# Patient Record
Sex: MALE | Race: WHITE | NOT HISPANIC OR LATINO | URBAN - METROPOLITAN AREA
[De-identification: names, ages, dates, MRNs, and addresses within clinical notes are randomized per-mention and may not be internally consistent; named-entity substitution may affect disease eponyms.]

---

## 2024-05-25 ENCOUNTER — EMERGENCY (EMERGENCY)
Facility: HOSPITAL | Age: 49
LOS: 1 days | Discharge: HOME | End: 2024-05-25
Attending: EMERGENCY MEDICINE | Admitting: EMERGENCY MEDICINE
Payer: COMMERCIAL

## 2024-05-25 VITALS
TEMPERATURE: 98 F | HEART RATE: 82 BPM | RESPIRATION RATE: 16 BRPM | OXYGEN SATURATION: 96 % | SYSTOLIC BLOOD PRESSURE: 147 MMHG | HEIGHT: 72 IN | DIASTOLIC BLOOD PRESSURE: 78 MMHG | WEIGHT: 175.05 LBS

## 2024-05-25 DIAGNOSIS — R09.A2 FOREIGN BODY SENSATION, THROAT: ICD-10-CM

## 2024-05-25 DIAGNOSIS — R07.0 PAIN IN THROAT: ICD-10-CM

## 2024-05-25 PROCEDURE — 31575 DIAGNOSTIC LARYNGOSCOPY: CPT

## 2024-05-25 PROCEDURE — 70490 CT SOFT TISSUE NECK W/O DYE: CPT | Mod: 26,MC

## 2024-05-25 PROCEDURE — 99284 EMERGENCY DEPT VISIT MOD MDM: CPT

## 2024-05-25 PROCEDURE — 70490 CT SOFT TISSUE NECK W/O DYE: CPT | Mod: MC

## 2024-05-25 PROCEDURE — 99285 EMERGENCY DEPT VISIT HI MDM: CPT | Mod: 25

## 2024-05-25 NOTE — ED PROVIDER NOTE - OBJECTIVE STATEMENT
47 yo m presents c/o fb sensation to throat after eating fish last night.  Pt reports no difficulty breathing/swallowing.

## 2024-05-25 NOTE — CONSULT NOTE ADULT - ASSESSMENT
ASSESSMENT/PLAN:    IMPRESSION: MARILEE RAMIREZ  is a 48y Male with no significant PMhx presenting with globus sensation with concern for fish bone. Flexible laryngoscopy within normal limits with no evidence of foreign body or mucosal trauma. CT neck performed to rule out submucous foreign body, none visualized. Patient most likely has a micro-scratch from fish bone with residual globus    RECOMMENDATIONS:  - Pepcid 20 mg daily for 1 week  - Strict return precautions discussed: fever, pain with neck ROM, inability to tolerate PO, shortness of breath  - Please page ENT with any additional questions or concerns

## 2024-05-25 NOTE — ED PROVIDER NOTE - CLINICAL SUMMARY MEDICAL DECISION MAKING FREE TEXT BOX
47 y/o m presents c/o throat pain, fb sensation to throat after eating fish last night, concerned for bony fb.  No airway compromise in ED, pt comfortable, tolerating his own secretions.  ENT consulted and no fb seen on direct laryngoscopy, recommend CT neck which was done and neg, pt feeling better, d/c

## 2024-05-25 NOTE — ED PROVIDER NOTE - ATTENDING APP SHARED VISIT CONTRIBUTION OF CARE
49 yo M c/o fish bone stuck in throat since eating dinner last pm w pain on R side.  Pt able to swallow w discomfort, no difficulty breathing, fever.  Well appearing, nad, nc/at, op benign w/o fb visualized, neck nontender w/o crepitus, no drool, strido, + tolerating own secretions, no swelling, ext no gross deformity, no gross neuro deficits  Pt c/o fish bone fb sensation.  Plan ENT consult for scope.  ? fb vs irritation s/p recent fb.  No aw compromise.

## 2024-05-25 NOTE — CONSULT NOTE ADULT - SUBJECTIVE AND OBJECTIVE BOX
OTOLARYNGOLOGY (ENT) CONSULTATION NOTE    PATIENT: MARILEE RAMIREZ     MRN: [id number]       : 73  DATE OF ADMISSION:24  DATE OF SERVICE:  24 @ 13:00    CHIEF COMPLAINT:  globus after eating fish bone     HISTORY OF PRESENT ILLNESS:  MARILEE RAMIREZ  is a 48y Male with no significant PMHx presenting to [facility] due to concern for a fish bone stuck in his throat, ENT consulted to evaluate. Patient reports that last night he ate branzino and immediately felt as though there was a potential bone stuck in the right side of his throat. Patient attempted to drink water, eat bread, and make himself vomit with slight improvement in the globus sensation he was experiencing. Throughout this time, he denies any pain with swallowing, dysphagia, dysphonia, dyspnea, fever, or pain with neck range of motion. He reported feeling better this morning but still having a residual scratchy sensation in his throat. No other complaints at this time.     PAST MEDICAL HISTORY:  No pertinent past medical history        CURRENT MEDICATIONS       HOME MEDICATIONS:      ALLERGIES:  No Known Allergies    SOCIAL HISTORY: Pertinent included in HPI   FAMILY HISTORY      SURGICAL HISTORY:      REVIEW OF SYSTEMS: The patient was asked and responded to a review of systems regarding the following symptoms: constitutional, eye, ears, nose, mouth, throat, cardiovascular, respiratory. Pertinent factors have been included in the HPI.     PHYSICAL EXAMINATION:  General: NAD, A+Ox3  Respiratory: No respiratory distress, stridor, or stertor  Voice quality: normal  Face:  Symmetric without masses or lesions  OU: EOMI  Right: Pinna wnl, EAC clear, TM intact, no effusion  Left: Pinna wnl, EAC clear, TM intact, no effusion  Nose: nasal cavity clear bilaterally, inferior turbinates normal, mucosa normal without crusting or bleeding  OC/OP: tongue normal, floor of mouth WNL, no masses or lesions, OP clear  Neck: soft/flat, no LAD  Neuro: CNII-XII intact      Findings:  LARYNGOSCOPY EXAM:     Verbal consent was obtained from patient prior to procedure.    Indication: rule out fish bone         Flexible laryngoscopy was performed and revealed the following:    Nasopharynx had no mass or exudate.    Base of tongue was symmetric and not enlarged.    Vallecula was clear    Epiglottis, both aryepiglottic folds and both false vocal folds were normal    Arytenoids both without edema and erythema     True vocal folds were fully mobile and without lesions.     Post cricoid area was clear    Interarytenoid edema was absent  No foreign body visualized or evidence of mucosal trauma     The patient tolerated the procedure well.           Vital Signs:  T(C): 36.7 (05-25-24 @ 10:48), Max: 36.7 (05-25-24 @ 10:48)  HR: 82 (05-25-24 @ 10:48) (82 - 82)  BP: 147/78 (05-25-24 @ 10:48) (147/78 - 147/78)  RR: 16 (05-25-24 @ 10:48) (16 - 16)  SpO2: 96% (05-25-24 @ 10:48) (96% - 96%)        [id number]    MICROBIOLOGY:      PATHOLOGY:

## 2024-05-25 NOTE — ED ADULT NURSE NOTE - OBJECTIVE STATEMENT
Patient came to ER stating "I ate a fish bone and it feels like there may still be something in my throat". Patient is able to speak in complete sentences, no drooling, no difficulty breathing.

## 2024-05-25 NOTE — ED PROVIDER NOTE - PATIENT PORTAL LINK FT
You can access the FollowMyHealth Patient Portal offered by [facility] by registering at the following website: [email or web address]. By joining [facility]’s FollowMyHealth portal, you will also be able to view your health information using other applications (apps) compatible with our system.

## 2024-05-25 NOTE — ED ADULT TRIAGE NOTE - CHIEF COMPLAINT QUOTE
Spoke to Livia May    CONFIRMED procedure arrival time of2 pm on 10/19/2020  Reiterated instructions including:  -Directions to check in desk  -NPO after midnight night prior to procedure  -Pre-procedure LABS being drawn on 10/17/2020  -COVID test being done 10/17/2020  -Confirmed no fever, cough, or shortness of breath in the past 30 days  -Confirmed no redness, rash, irritation, or yeast infection to groin area.     -Bathe night prior and morning prior to procedure with Hibiclens solution or an antibacterial soap  -Reviewed current visitor policy    Pt verbalizes understanding of above and appreciates call.  
Fish bone stuck in throat since last night. Pt maintaining secretions. Denies PMH.

## 2024-05-25 NOTE — ED PROVIDER NOTE - CPE EDP RESP NORM
Per verbal order of dr. Vamshi Bettencourt pt can start an antibiotic for 10 days.  Pt informed via voicemail     Date of last visit:  12/28/2021  Date of next visit:  Visit date not found    Requested Prescriptions     Signed Prescriptions Disp Refills    cephALEXin (KEFLEX) 500 MG capsule 30 capsule 0     Sig: Take 1 capsule by mouth 3 times daily for 10 days     Authorizing Provider: Consuelo Jj     Ordering User: Gerry Carmona
How Severe Is Your Skin Lesion?: mild
Have Your Skin Lesions Been Treated?: not been treated
Is This A New Presentation, Or A Follow-Up?: Skin Lesion
normal...

## 2025-03-07 ENCOUNTER — APPOINTMENT (OUTPATIENT)
Dept: URBAN - METROPOLITAN AREA CLINIC 152 | Facility: CLINIC | Age: 50
Setting detail: DERMATOLOGY
End: 2025-03-07

## 2025-03-07 DIAGNOSIS — L21.8 OTHER SEBORRHEIC DERMATITIS: ICD-10-CM | Status: WELL CONTROLLED

## 2025-03-07 PROCEDURE — ? PRESCRIPTION

## 2025-03-07 PROCEDURE — ? DIAGNOSIS COMMENT

## 2025-03-07 PROCEDURE — ? PRESCRIPTION MEDICATION MANAGEMENT

## 2025-03-07 PROCEDURE — ? COUNSELING

## 2025-03-07 PROCEDURE — 99204 OFFICE O/P NEW MOD 45 MIN: CPT

## 2025-03-07 RX ORDER — FLUOCINONIDE 0.5 MG/ML
SOLUTION TOPICAL
Qty: 60 | Refills: 3 | Status: ERX | COMMUNITY
Start: 2025-03-07

## 2025-03-07 RX ORDER — KETOCONAZOLE 20 MG/ML
SHAMPOO, SUSPENSION TOPICAL
Qty: 120 | Refills: 4 | Status: ERX | COMMUNITY
Start: 2025-03-07

## 2025-03-07 RX ADMIN — KETOCONAZOLE: 20 SHAMPOO, SUSPENSION TOPICAL at 00:00

## 2025-03-07 RX ADMIN — FLUOCINONIDE: 0.5 SOLUTION TOPICAL at 00:00

## 2025-03-07 NOTE — HPI: RASH (SEBORRHEIC DERMATITIS)
How Severe Is It?: mild
Is This A New Presentation, Or A Follow-Up?: Follow Up Seborrheic Dermatitis
Additional History: Pt is here for dandruff, has been using Fluocinolone Solution, pt states

## 2025-03-07 NOTE — PROCEDURE: DIAGNOSIS COMMENT
Comment: mild today on exam \\nDiscussed nature and etiology. \\npreviously was using fluocinonide and ketoconazole - admits kept seb derm controlled - needs refills today \\nalso alternating between anti-dandruff OTC shampoos- feel like its helping - admits he rarely applied steroid - once every few months if that \\nRecommended pt to continue fluocinonide 0.05 % topical solution, Apply to the affected areas on scalp MWF for 3 weeks repeat during flares\\nRecommended pt to continue ketoconazole 2 % shampoo, Lather into scalp 2-3x/week & allow to sit for 5 minutes then rinse out.\\nRV 6 months or sooner if needed
Render Risk Assessment In Note?: no
Detail Level: Simple

## 2025-03-07 NOTE — PROCEDURE: PRESCRIPTION MEDICATION MANAGEMENT
Continue Regimen: fluocinonide 0.05 % topical solution \\nketoconazole 2 % shampoo
Detail Level: Zone
Render In Strict Bullet Format?: No

## 2025-07-16 ENCOUNTER — APPOINTMENT (OUTPATIENT)
Dept: URBAN - METROPOLITAN AREA CLINIC 151 | Facility: CLINIC | Age: 50
Setting detail: DERMATOLOGY
End: 2025-07-16

## 2025-07-16 DIAGNOSIS — B02.9 ZOSTER WITHOUT COMPLICATIONS: ICD-10-CM | Status: RESOLVING

## 2025-07-16 PROCEDURE — ? PRESCRIPTION

## 2025-07-16 PROCEDURE — ? DIAGNOSIS COMMENT

## 2025-07-16 PROCEDURE — ? COUNSELING

## 2025-07-16 RX ORDER — TRIAMCINOLONE ACETONIDE 1 MG/G
CREAM TOPICAL
Qty: 80 | Refills: 0 | Status: ERX | COMMUNITY
Start: 2025-07-16

## 2025-07-16 RX ADMIN — TRIAMCINOLONE ACETONIDE: 1 CREAM TOPICAL at 00:00

## 2025-07-16 ASSESSMENT — LOCATION DETAILED DESCRIPTION DERM
LOCATION DETAILED: T2 RIGHT ANTERIOR DERMATOME
LOCATION DETAILED: T1 RIGHT ANTERIOR DERMATOME
LOCATION DETAILED: T1 RIGHT POSTERIOR DERMATOME

## 2025-07-16 ASSESSMENT — LOCATION ZONE DERM: LOCATION ZONE: TRUNK

## 2025-07-16 ASSESSMENT — LOCATION SIMPLE DESCRIPTION DERM
LOCATION SIMPLE: T1 RIGHT POSTERIOR DERMATOME
LOCATION SIMPLE: T1 RIGHT ANTERIOR DERMATOME
LOCATION SIMPLE: T2 RIGHT ANTERIOR DERMATOME

## 2025-07-16 NOTE — HPI: OTHER
Condition:: Shingles lesions
Please Describe Your Condition:: Healing from shingles started antiviral (avalocyline) last Tuesday. Has gotten better. No fever, nerve sensitivity sharp pains from shingles medication, has psoriatic arthritis (since 30s) not on pain medication. No PCP.

## 2025-07-16 NOTE — PROCEDURE: DIAGNOSIS COMMENT
Detail Level: Simple
Comment: disc etiology and nature in great detail with pt- he was started on antiviral medications (acyclovir vs valacyclovir ) and was rx 10 days - shingles already crusted over and improving - pt c/o of shooting pain - disc with pt shingles affects sensory nerves and their dermatomes - discomfort can sometimes last months post shingles - if does not resolve after some time consider possible post herpetic neuralgia -rx TAC 0.1 BID  as needed for itching/ irritation
Render Risk Assessment In Note?: no